# Patient Record
Sex: FEMALE | Race: ASIAN | Employment: FULL TIME | URBAN - METROPOLITAN AREA
[De-identification: names, ages, dates, MRNs, and addresses within clinical notes are randomized per-mention and may not be internally consistent; named-entity substitution may affect disease eponyms.]

---

## 2019-06-04 ENCOUNTER — OFFICE VISIT (OUTPATIENT)
Dept: PRIMARY CARE CLINIC | Age: 29
End: 2019-06-04

## 2019-06-04 VITALS
HEART RATE: 87 BPM | BODY MASS INDEX: 23.46 KG/M2 | TEMPERATURE: 98.5 F | WEIGHT: 140.8 LBS | HEIGHT: 65 IN | RESPIRATION RATE: 16 BRPM | OXYGEN SATURATION: 100 % | SYSTOLIC BLOOD PRESSURE: 95 MMHG | DIASTOLIC BLOOD PRESSURE: 61 MMHG

## 2019-06-04 DIAGNOSIS — E53.8 B12 DEFICIENCY DUE TO DIET: ICD-10-CM

## 2019-06-04 DIAGNOSIS — E55.9 VITAMIN D DEFICIENCY: ICD-10-CM

## 2019-06-04 DIAGNOSIS — Z00.00 PHYSICAL EXAM: Primary | ICD-10-CM

## 2019-06-04 NOTE — PROGRESS NOTES
Written by Pancho Broderick, as dictated by Dr. Jane Landry MD.    Gris Galvin is a 42 y.o. female. HPI  The patient comes in today to establish care. Patient is requesting a complete physical exam and labs. She is not fasting for labs today. Pt has mild seasonal allergies. Denies headaches, heartburn, constipation, urinary sxs, leg swelling. Patient has been feeling tired and requests her iron and vitamin B12 levels be checked. She previously took iron tablets, but now is only taking a multivitamin. The pt follows a vegetarian diet. Sometimes she has pain from her R calf to her R foot. This pain occurs when she is laying down and started about 3 months ago. This occurs once every 1-2 months and lasts for several days. She admits that she does not exercise regularly. The pt received multiple vaccinations during her pregnancy, but is not sure which vaccines she received. She has an 21 month old son. Her pregnancy was normal, but she has a  as the umbilical cord was around her son's neck. Last pap smear was about 6 months ago and was normal.    Patient works at [employer]. No current outpatient medications on file prior to visit. No current facility-administered medications on file prior to visit.         Past Surgical History:   Procedure Laterality Date    HX  SECTION         Social History     Socioeconomic History    Marital status:      Spouse name: Not on file    Number of children: Not on file    Years of education: Not on file    Highest education level: Not on file   Occupational History    Not on file   Social Needs    Financial resource strain: Not on file    Food insecurity:     Worry: Not on file     Inability: Not on file    Transportation needs:     Medical: Not on file     Non-medical: Not on file   Tobacco Use    Smoking status: Never Smoker    Smokeless tobacco: Never Used   Substance and Sexual Activity    Alcohol use: Not Currently     Frequency: Never    Drug use: Never    Sexual activity: Not on file   Lifestyle    Physical activity:     Days per week: Not on file     Minutes per session: Not on file    Stress: Not on file   Relationships    Social connections:     Talks on phone: Not on file     Gets together: Not on file     Attends Yazdanism service: Not on file     Active member of club or organization: Not on file     Attends meetings of clubs or organizations: Not on file     Relationship status: Not on file    Intimate partner violence:     Fear of current or ex partner: Not on file     Emotionally abused: Not on file     Physically abused: Not on file     Forced sexual activity: Not on file   Other Topics Concern    Not on file   Social History Narrative    Not on file       Review of Systems   Constitutional: Positive for malaise/fatigue. HENT: Negative for congestion. Eyes: Negative for blurred vision and pain. Respiratory: Negative for cough and shortness of breath. Cardiovascular: Negative for chest pain and palpitations. Gastrointestinal: Negative for abdominal pain, constipation and heartburn. Genitourinary: Negative for frequency and urgency. Musculoskeletal: Negative for joint pain and myalgias. +RLE pain   Neurological: Negative for dizziness, tingling, sensory change, weakness and headaches. Endo/Heme/Allergies: Positive for environmental allergies. Psychiatric/Behavioral: Negative for depression, memory loss and substance abuse. Visit Vitals  BP 95/61 (BP 1 Location: Left arm, BP Patient Position: Sitting)   Pulse 87   Temp 98.5 °F (36.9 °C) (Oral)   Resp 16   Ht 5' 5\" (1.651 m)   Wt 140 lb 12.8 oz (63.9 kg)   LMP 05/12/2019   SpO2 100%   BMI 23.43 kg/m²       Physical Exam   Constitutional: She is oriented to person, place, and time. She appears well-developed and well-nourished. No distress.    HENT:   Right Ear: Tympanic membrane, external ear and ear canal normal.   Left Ear: Tympanic membrane, external ear and ear canal normal.   Mouth/Throat: Oropharynx is clear and moist.   Eyes: Conjunctivae and EOM are normal. Right eye exhibits no discharge. Left eye exhibits no discharge. Neck: Normal range of motion. Neck supple. No thyromegaly present. Cardiovascular: Normal rate, regular rhythm and normal heart sounds. Pulses:       Dorsalis pedis pulses are 2+ on the right side, and 2+ on the left side. Pulmonary/Chest: Effort normal and breath sounds normal. She has no wheezes. Abdominal: Soft. Bowel sounds are normal. There is no tenderness. Musculoskeletal: She exhibits no edema. Lymphadenopathy:     She has no cervical adenopathy. Neurological: She is alert and oriented to person, place, and time. Reflex Scores:       Patellar reflexes are 2+ on the right side and 2+ on the left side. Skin: She is not diaphoretic. Psychiatric: She has a normal mood and affect. Her behavior is normal.   Nursing note and vitals reviewed. ASSESSMENT and PLAN    ICD-10-CM ICD-9-CM    1. Physical exam Z74.47 H43.7 METABOLIC PANEL, COMPREHENSIVE      CBC W/O DIFF      TSH 3RD GENERATION      LIPID PANEL    Complete physical exam done. Pt will return during lab hours to have basic fasting labs drawn. 2. B12 deficiency due to diet E53.8 266.2 VITAMIN B12    Vitamin B12 ordered. 3. Vitamin D deficiency E55.9 268.9 VITAMIN D, 25 HYDROXY    Vitamin D ordered. She should contact her OB/GYN to find out when she received her last Tdap and tell my office. This plan was reviewed with the patient and patient agrees. All questions were answered. This scribe documentation was reviewed by me and accurately reflects the examination and decisions made by me. This note will not be viewable in [address]

## 2019-06-04 NOTE — PROGRESS NOTES
Chief Complaint   Patient presents with    Physical     Pap in last 6 months. Visit Vitals  BP 95/61 (BP 1 Location: Left arm, BP Patient Position: Sitting)   Pulse 87   Temp 98.5 °F (36.9 °C) (Oral)   Resp 16   Ht 5' 5\" (1.651 m)   Wt 140 lb 12.8 oz (63.9 kg)   SpO2 100%   BMI 23.43 kg/m²     1. Have you been to the ER, urgent care clinic since your last visit? Hospitalized since your last visit? No    2. Have you seen or consulted any other health care providers outside of the [address] [place] [address] since your last visit? Include any pap smears or colon screening.  GYN

## 2019-06-05 DIAGNOSIS — E53.8 B12 DEFICIENCY DUE TO DIET: ICD-10-CM

## 2019-06-05 DIAGNOSIS — E55.9 VITAMIN D DEFICIENCY: ICD-10-CM

## 2019-06-05 DIAGNOSIS — Z00.00 PHYSICAL EXAM: ICD-10-CM

## 2019-06-06 LAB
25(OH)D3+25(OH)D2 SERPL-MCNC: 18.5 NG/ML (ref 30–100)
ALBUMIN SERPL-MCNC: 4.4 G/DL (ref 3.5–5.5)
ALBUMIN/GLOB SERPL: 1.7 {RATIO} (ref 1.2–2.2)
ALP SERPL-CCNC: 96 IU/L (ref 39–117)
ALT SERPL-CCNC: 29 IU/L (ref 0–32)
AST SERPL-CCNC: 22 IU/L (ref 0–40)
BILIRUB SERPL-MCNC: 0.6 MG/DL (ref 0–1.2)
BUN SERPL-MCNC: 9 MG/DL (ref 6–20)
BUN/CREAT SERPL: 14 (ref 9–23)
CALCIUM SERPL-MCNC: 9.5 MG/DL (ref 8.7–10.2)
CHLORIDE SERPL-SCNC: 103 MMOL/L (ref 96–106)
CHOLEST SERPL-MCNC: 165 MG/DL (ref 100–199)
CO2 SERPL-SCNC: 22 MMOL/L (ref 20–29)
CREAT SERPL-MCNC: 0.65 MG/DL (ref 0.57–1)
ERYTHROCYTE [DISTWIDTH] IN BLOOD BY AUTOMATED COUNT: 13.4 % (ref 12.3–15.4)
GLOBULIN SER CALC-MCNC: 2.6 G/DL (ref 1.5–4.5)
GLUCOSE SERPL-MCNC: 86 MG/DL (ref 65–99)
HCT VFR BLD AUTO: 40.2 % (ref 34–46.6)
HDLC SERPL-MCNC: 37 MG/DL
HGB BLD-MCNC: 13.1 G/DL (ref 11.1–15.9)
LDLC SERPL CALC-MCNC: 63 MG/DL (ref 0–99)
MCH RBC QN AUTO: 28.1 PG (ref 26.6–33)
MCHC RBC AUTO-ENTMCNC: 32.6 G/DL (ref 31.5–35.7)
MCV RBC AUTO: 86 FL (ref 79–97)
PLATELET # BLD AUTO: 176 X10E3/UL (ref 150–450)
POTASSIUM SERPL-SCNC: 4.1 MMOL/L (ref 3.5–5.2)
PROT SERPL-MCNC: 7 G/DL (ref 6–8.5)
RBC # BLD AUTO: 4.67 X10E6/UL (ref 3.77–5.28)
SODIUM SERPL-SCNC: 140 MMOL/L (ref 134–144)
TRIGL SERPL-MCNC: 324 MG/DL (ref 0–149)
TSH SERPL DL<=0.005 MIU/L-ACNC: 2.11 UIU/ML (ref 0.45–4.5)
VIT B12 SERPL-MCNC: 641 PG/ML (ref 232–1245)
VLDLC SERPL CALC-MCNC: 65 MG/DL (ref 5–40)
WBC # BLD AUTO: 8.3 X10E3/UL (ref 3.4–10.8)

## 2019-06-10 NOTE — PROGRESS NOTES
Virgle Round, your cholesterol came back high. Were you fasting for labs? Vitamin d came back low. Please take over the counter Vitamin D 3 1000 I.U daily dose. Also, if you were not fasting for labs please stop by at office any day between 8-11:30 to get repeat cholesterol checked. Make sure you are fasting for 8 hrs.

## 2019-12-09 ENCOUNTER — OFFICE VISIT (OUTPATIENT)
Dept: PRIMARY CARE CLINIC | Age: 29
End: 2019-12-09

## 2019-12-09 VITALS
HEIGHT: 65 IN | HEART RATE: 88 BPM | OXYGEN SATURATION: 100 % | WEIGHT: 139 LBS | BODY MASS INDEX: 23.16 KG/M2 | SYSTOLIC BLOOD PRESSURE: 100 MMHG | TEMPERATURE: 98.1 F | RESPIRATION RATE: 17 BRPM | DIASTOLIC BLOOD PRESSURE: 71 MMHG

## 2019-12-09 DIAGNOSIS — A02.9 SALMONELLA INFECTION, UNSPECIFIED: Primary | ICD-10-CM

## 2019-12-09 DIAGNOSIS — R05.3 PERSISTENT DRY COUGH: ICD-10-CM

## 2019-12-09 DIAGNOSIS — R35.0 URINARY FREQUENCY: ICD-10-CM

## 2019-12-09 DIAGNOSIS — B37.31 VAGINAL CANDIDIASIS: ICD-10-CM

## 2019-12-09 LAB
BILIRUB UR QL STRIP: NEGATIVE
GLUCOSE UR-MCNC: NEGATIVE MG/DL
KETONES P FAST UR STRIP-MCNC: NEGATIVE MG/DL
PH UR STRIP: 7.5 [PH] (ref 4.6–8)
PROT UR QL STRIP: NEGATIVE
SP GR UR STRIP: 1.02 (ref 1–1.03)
UA UROBILINOGEN AMB POC: NORMAL (ref 0.2–1)
URINALYSIS CLARITY POC: CLEAR
URINALYSIS COLOR POC: NORMAL
URINE BLOOD POC: NEGATIVE
URINE LEUKOCYTES POC: NORMAL
URINE NITRITES POC: NEGATIVE

## 2019-12-09 RX ORDER — FLUCONAZOLE 150 MG/1
150 TABLET ORAL DAILY
Qty: 1 TAB | Refills: 0 | Status: SHIPPED | OUTPATIENT
Start: 2019-12-09 | End: 2019-12-10

## 2019-12-09 RX ORDER — BENZONATATE 200 MG/1
200 CAPSULE ORAL
Qty: 21 CAP | Refills: 0 | Status: SHIPPED | OUTPATIENT
Start: 2019-12-09 | End: 2019-12-16

## 2019-12-09 NOTE — PROGRESS NOTES
Written by Janak Wheeler, as dictated by Dr. Marilu Simmonds, MD.    Emily Paz is a 30 y.o. female. HPI  The patient presents today c/o fever and vomiting. She recently returned from a 4 week trip to [facility], and while there, about 2 weeks ago, she had fever, vomiting, and diarrhea. She went to the Urgent Care Clinic, and was diagnosed with Salmonella infection. She was then prescribed Quinolone abx x 5 days. The GI symptoms resolved, but she still has a cough, which at times brings up white phlegm. She has tried cough syrup without improvement, and the coughing has kept her up at night. She believes she has a urinary tract infection, as she has been urinating frequently, though denies dysuria. She reports having white vaginal discharge, but otherwise denies vaginal pruritis. No current outpatient medications on file prior to visit. No current facility-administered medications on file prior to visit.         Past Surgical History:   Procedure Laterality Date    HX  SECTION         Social History     Socioeconomic History    Marital status:      Spouse name: Not on file    Number of children: Not on file    Years of education: Not on file    Highest education level: Not on file   Occupational History    Not on file   Social Needs    Financial resource strain: Not on file    Food insecurity:     Worry: Not on file     Inability: Not on file    Transportation needs:     Medical: Not on file     Non-medical: Not on file   Tobacco Use    Smoking status: Never Smoker    Smokeless tobacco: Never Used   Substance and Sexual Activity    Alcohol use: Not Currently     Frequency: Never    Drug use: Never    Sexual activity: Not on file   Lifestyle    Physical activity:     Days per week: Not on file     Minutes per session: Not on file    Stress: Not on file   Relationships    Social connections:     Talks on phone: Not on file     Gets together: Not on file     Attends [employer] service: Not on file     Active member of club or organization: Not on file     Attends meetings of clubs or organizations: Not on file     Relationship status: Not on file    Intimate partner violence:     Fear of current or ex partner: Not on file     Emotionally abused: Not on file     Physically abused: Not on file     Forced sexual activity: Not on file   Other Topics Concern    Not on file   Social History Narrative    Not on file       Review of Systems   Constitutional: Positive for fever. Negative for malaise/fatigue and weight loss. HENT: Negative for congestion and hearing loss. Respiratory: Positive for cough. Negative for shortness of breath. Gastrointestinal: Positive for abdominal pain (resolved), diarrhea (resolved), nausea (resolved) and vomiting (resolved). Negative for constipation and heartburn. Genitourinary: Positive for frequency. Negative for dysuria and urgency. + white vaginal discharge   Musculoskeletal: Negative for joint pain and myalgias. Neurological: Negative for dizziness and headaches. Psychiatric/Behavioral: Negative for depression and substance abuse. The patient is not nervous/anxious and does not have insomnia. Visit Vitals  /71 (BP 1 Location: Left arm, BP Patient Position: Sitting)   Pulse 88   Temp 98.1 °F (36.7 °C) (Oral)   Resp 17   Ht 5' 5\" (1.651 m)   Wt 139 lb (63 kg)   LMP 11/22/2019   SpO2 100%   BMI 23.13 kg/m²       Physical Exam  Vitals signs and nursing note reviewed. Constitutional:       General: She is not in acute distress. Appearance: She is well-developed. She is not diaphoretic. HENT:      Head: Normocephalic and atraumatic. Right Ear: External ear normal.      Left Ear: External ear normal.   Eyes:      General:         Right eye: No discharge. Left eye: No discharge.       Conjunctiva/sclera: Conjunctivae normal.      Pupils: Pupils are equal, round, and reactive to light. Neck:      Musculoskeletal: Normal range of motion and neck supple. Cardiovascular:      Rate and Rhythm: Normal rate and regular rhythm. Heart sounds: Normal heart sounds. No murmur. No friction rub. No gallop. Pulmonary:      Effort: Pulmonary effort is normal.      Breath sounds: Normal breath sounds. No wheezing. Abdominal:      General: Bowel sounds are normal.      Palpations: Abdomen is soft. Tenderness: There is no tenderness. Musculoskeletal: Normal range of motion. Neurological:      Mental Status: She is alert and oriented to person, place, and time. Deep Tendon Reflexes: Reflexes are normal and symmetric. Psychiatric:         Behavior: Behavior normal.         Thought Content: Thought content normal.         ASSESSMENT and PLAN    ICD-10-CM ICD-9-CM    1. Salmonella infection, unspecified A02.9 003.9 Discussed Salmonella infection does not cause URI issues, and most likely resolved with the resolution of her stomach issues. 2. Urinary frequency R35.0 788.41 AMB POC URINALYSIS DIP STICK AUTO W/ MICRO     POC UA showed Trace Leukocyte esterase. will send the culture out. 3. Persistent dry cough R05 786.2 benzonatate (TESSALON) 200 mg capsule sent to pharmacy. Tessalon perle 200 mg prescribed. Potential side effects were discussed. Pt should take 1 perle TID as needed for cough. Advised pt to keep hydrated. 4. Vaginal candidiasis B37.3 112.1 fluconazole (DIFLUCAN) 150 mg tablet sent to pharmacy. Diflucan 150 mg prescribed. Potential side effects were discussed. Pt should take 1 tab x 1 day. This plan was reviewed with the patient and patient agrees. All questions were answered. This scribe documentation was reviewed by me and accurately reflects the examination and decisions made by me. This note will not be viewable in [address]

## 2019-12-24 ENCOUNTER — OFFICE VISIT (OUTPATIENT)
Dept: PRIMARY CARE CLINIC | Age: 29
End: 2019-12-24

## 2019-12-24 VITALS
SYSTOLIC BLOOD PRESSURE: 122 MMHG | BODY MASS INDEX: 23.09 KG/M2 | TEMPERATURE: 98.8 F | HEART RATE: 84 BPM | RESPIRATION RATE: 14 BRPM | WEIGHT: 138.6 LBS | OXYGEN SATURATION: 100 % | DIASTOLIC BLOOD PRESSURE: 78 MMHG | HEIGHT: 65 IN

## 2019-12-24 DIAGNOSIS — M79.605 PAIN IN BOTH LOWER EXTREMITIES: ICD-10-CM

## 2019-12-24 DIAGNOSIS — R42 DIZZINESS: Primary | ICD-10-CM

## 2019-12-24 DIAGNOSIS — R53.83 OTHER FATIGUE: ICD-10-CM

## 2019-12-24 DIAGNOSIS — R09.81 NASAL CONGESTION: ICD-10-CM

## 2019-12-24 DIAGNOSIS — M79.604 PAIN IN BOTH LOWER EXTREMITIES: ICD-10-CM

## 2019-12-24 RX ORDER — FLUTICASONE PROPIONATE 50 MCG
SPRAY, SUSPENSION (ML) NASAL
Qty: 1 BOTTLE | Refills: 0 | Status: SHIPPED | OUTPATIENT
Start: 2019-12-24

## 2019-12-24 RX ORDER — GLUCOSAMINE SULFATE 1500 MG
POWDER IN PACKET (EA) ORAL DAILY
COMMUNITY

## 2019-12-24 NOTE — PROGRESS NOTES
Visit Vitals  /78 (BP 1 Location: Left arm, BP Patient Position: Sitting)   Pulse 84   Temp 98.8 °F (37.1 °C) (Oral)   Resp 14   Ht 5' 5\" (1.651 m)   Wt 138 lb 9.6 oz (62.9 kg)   SpO2 100%   BMI 23.06 kg/m²           Chief Complaint   Patient presents with    Dizziness     x 3 days     Fatigue     Increased x 3 days     Leg Pain     Worse at night, restlessness, wakes her up at night            HM Due Reviewed and WNL            1. Have you been to the ER, urgent care clinic since your last visit? Hospitalized since your last visit? Denies     2. Have you seen or consulted any other health care providers outside of the [address] [place] [address] since your last visit? Include any pap smears or colon screening.  Denies

## 2019-12-24 NOTE — PROGRESS NOTES
Written by Mary Canales, as dictated by Dr. Anmol Faulkner MD.    Ambrose Devi is a 30 y.o. female. HPI Pt is accompanied by her . The patient presents today for dizziness and fatigue. She is not fasting for labs. She reports 2-3 days of intermittent dizziness, especially after sitting for a long time then standing. Her  reports that she has not been drinking much water. She notes that she is on her period, but these symptoms started before her menstrual cycle began. BP looks good at office today at 122/78. She notes associated fatigue, weakness, and nausea. She tends to have heavy periods, but thinks that it is heavier now than usual. She states that her symptoms are improved with drinking salt and sugar water. She also endorses bilateral leg pain (R>L) that feels like a muscle strain, especially when laying down at night. She states that massaging her legs mitigates the pain. She states that she was taking her Vitamin D supplements for 2 months, stopped, then restarted 3 days ago. She notes bilateral leg pain for the past 6-7 months, but it is worse with the dizziness and fatigue. Denies bladder and bowel issues. Of note, she reports that her cough has resolved. Current Outpatient Medications on File Prior to Visit   Medication Sig Dispense Refill    cholecalciferol (VITAMIN D3) 25 mcg (1,000 unit) cap Take  by mouth daily. No current facility-administered medications on file prior to visit.         Past Surgical History:   Procedure Laterality Date    HX  SECTION       Social History     Socioeconomic History    Marital status:      Spouse name: Not on file    Number of children: Not on file    Years of education: Not on file    Highest education level: Not on file   Occupational History    Not on file   Social Needs    Financial resource strain: Not on file    Food insecurity:     Worry: Not on file Inability: Not on file    Transportation needs:     Medical: Not on file     Non-medical: Not on file   Tobacco Use    Smoking status: Never Smoker    Smokeless tobacco: Never Used   Substance and Sexual Activity    Alcohol use: Not Currently     Frequency: Never    Drug use: Never    Sexual activity: Not on file   Lifestyle    Physical activity:     Days per week: Not on file     Minutes per session: Not on file    Stress: Not on file   Relationships    Social connections:     Talks on phone: Not on file     Gets together: Not on file     Attends [employer] service: Not on file     Active member of club or organization: Not on file     Attends meetings of clubs or organizations: Not on file     Relationship status: Not on file    Intimate partner violence:     Fear of current or ex partner: Not on file     Emotionally abused: Not on file     Physically abused: Not on file     Forced sexual activity: Not on file   Other Topics Concern    Not on file   Social History Narrative    Not on file       Office Visit on 12/09/2019   Component Date Value Ref Range Status    Color (UA POC) 12/09/2019 Dark Yellow   Final    Clarity (UA POC) 12/09/2019 Clear   Final    Glucose (UA POC) 12/09/2019 Negative  Negative Final    Bilirubin (UA POC) 12/09/2019 Negative  Negative Final    Ketones (UA POC) 12/09/2019 Negative  Negative Final    Specific gravity (UA POC) 12/09/2019 1.020  1.001 - 1.035 Final    Blood (UA POC) 12/09/2019 Negative  Negative Final    pH (UA POC) 12/09/2019 7.5  4.6 - 8.0 Final    Protein (UA POC) 12/09/2019 Negative  Negative Final    Urobilinogen (UA POC) 12/09/2019 normal  0.2 - 1 Final    Nitrites (UA POC) 12/09/2019 Negative  Negative Final    Leukocyte esterase (UA POC) 12/09/2019 Trace  Negative Final       Review of Systems   Constitutional: Positive for malaise/fatigue. Negative for weight loss. HENT: Negative for congestion and hearing loss.     Eyes: Negative for blurred vision and photophobia. Respiratory: Negative for cough and shortness of breath. Cardiovascular: Negative for chest pain and leg swelling. Gastrointestinal: Positive for nausea. Negative for constipation, diarrhea and heartburn. Genitourinary: Negative for dysuria, frequency and urgency. Musculoskeletal: Negative for joint pain and myalgias. Positive for BL leg pain. Neurological: Positive for dizziness and weakness. Negative for headaches. Psychiatric/Behavioral: Negative for depression and substance abuse. The patient is not nervous/anxious and does not have insomnia. Visit Vitals  /78 (BP 1 Location: Left arm, BP Patient Position: Sitting)   Pulse 84   Temp 98.8 °F (37.1 °C) (Oral)   Resp 14   Ht 5' 5\" (1.651 m)   Wt 138 lb 9.6 oz (62.9 kg)   LMP 12/21/2019   SpO2 100%   BMI 23.06 kg/m²       Physical Exam  Vitals signs and nursing note reviewed. Constitutional:       General: She is not in acute distress. Appearance: Normal appearance. She is well-developed and normal weight. She is not diaphoretic. HENT:      Head: Normocephalic and atraumatic. Right Ear: External ear normal.      Left Ear: External ear normal.      Nose:      Right Sinus: Maxillary sinus tenderness present. Left Sinus: Maxillary sinus tenderness present. Eyes:      General:         Right eye: No discharge. Left eye: No discharge. Conjunctiva/sclera: Conjunctivae normal.      Pupils: Pupils are equal, round, and reactive to light. Neck:      Musculoskeletal: Normal range of motion and neck supple. Cardiovascular:      Rate and Rhythm: Normal rate and regular rhythm. Heart sounds: Normal heart sounds. No murmur. No friction rub. No gallop. Pulmonary:      Effort: Pulmonary effort is normal.      Breath sounds: Normal breath sounds. No wheezing. Abdominal:      General: Bowel sounds are normal.      Palpations: Abdomen is soft. Tenderness: There is no tenderness. Musculoskeletal: Normal range of motion. Neurological:      Mental Status: She is alert and oriented to person, place, and time. Deep Tendon Reflexes: Reflexes are normal and symmetric. Psychiatric:         Behavior: Behavior normal.         Thought Content: Thought content normal.         ASSESSMENT and PLAN    ICD-10-CM ICD-9-CM    1. Dizziness R42 780.4 CBC W/O DIFF      METABOLIC PANEL, COMPREHENSIVE    CBC and CMP ordered. Recommended that the patient take Allegra or Zyrtec daily for the next week. Also advised her to drink 5-6 glasses of water daily. Written instructions given to the patient. CBC ordered. 2. Other fatigue R53.83 780.79 CBC W/O DIFF    Low hemoglobin can cause fatigue. 3. Pain in both lower extremities M79.604 729.5 Advised the pt to resume taking her Vitamin D supplements. Recommended that she consume beans, apples, and spinach to increase iron levels. M79.605     4. Nasal congestion R09.81 478.19 fluticasone propionate (FLONASE) 50 mcg/actuation nasal spray sent to pharmacy    Flonase 50 mcg prescribed. Potential side effects were discussed. Pt should use one spray per nostril daily. This plan was reviewed with the patient and patient agrees. All questions were answered. This scribe documentation was reviewed by me and accurately reflects the examination and decisions made by me. This note will not be viewable in [address]

## 2019-12-25 LAB
ALBUMIN SERPL-MCNC: 4.3 G/DL (ref 3.5–5.5)
ALBUMIN/GLOB SERPL: 1.5 {RATIO} (ref 1.2–2.2)
ALP SERPL-CCNC: 80 IU/L (ref 39–117)
ALT SERPL-CCNC: 18 IU/L (ref 0–32)
AST SERPL-CCNC: 22 IU/L (ref 0–40)
BILIRUB SERPL-MCNC: 0.3 MG/DL (ref 0–1.2)
BUN SERPL-MCNC: 10 MG/DL (ref 6–20)
BUN/CREAT SERPL: 16 (ref 9–23)
CALCIUM SERPL-MCNC: 9.3 MG/DL (ref 8.7–10.2)
CHLORIDE SERPL-SCNC: 103 MMOL/L (ref 96–106)
CO2 SERPL-SCNC: 25 MMOL/L (ref 20–29)
CREAT SERPL-MCNC: 0.61 MG/DL (ref 0.57–1)
ERYTHROCYTE [DISTWIDTH] IN BLOOD BY AUTOMATED COUNT: 12.9 % (ref 12.3–15.4)
GLOBULIN SER CALC-MCNC: 2.9 G/DL (ref 1.5–4.5)
GLUCOSE SERPL-MCNC: 82 MG/DL (ref 65–99)
HCT VFR BLD AUTO: 37 % (ref 34–46.6)
HGB BLD-MCNC: 12.9 G/DL (ref 11.1–15.9)
MCH RBC QN AUTO: 28.4 PG (ref 26.6–33)
MCHC RBC AUTO-ENTMCNC: 34.9 G/DL (ref 31.5–35.7)
MCV RBC AUTO: 81 FL (ref 79–97)
PLATELET # BLD AUTO: 209 X10E3/UL (ref 150–450)
POTASSIUM SERPL-SCNC: 4.4 MMOL/L (ref 3.5–5.2)
PROT SERPL-MCNC: 7.2 G/DL (ref 6–8.5)
RBC # BLD AUTO: 4.55 X10E6/UL (ref 3.77–5.28)
SODIUM SERPL-SCNC: 139 MMOL/L (ref 134–144)
WBC # BLD AUTO: 6.8 X10E3/UL (ref 3.4–10.8)

## 2020-08-25 ENCOUNTER — OFFICE VISIT (OUTPATIENT)
Dept: PRIMARY CARE CLINIC | Age: 30
End: 2020-08-25
Payer: COMMERCIAL

## 2020-08-25 VITALS
TEMPERATURE: 97.3 F | OXYGEN SATURATION: 100 % | BODY MASS INDEX: 23.36 KG/M2 | SYSTOLIC BLOOD PRESSURE: 110 MMHG | WEIGHT: 140.2 LBS | HEIGHT: 65 IN | DIASTOLIC BLOOD PRESSURE: 74 MMHG | HEART RATE: 81 BPM | RESPIRATION RATE: 16 BRPM

## 2020-08-25 DIAGNOSIS — Z12.4 CERVICAL CANCER SCREENING: ICD-10-CM

## 2020-08-25 DIAGNOSIS — E55.9 VITAMIN D DEFICIENCY: ICD-10-CM

## 2020-08-25 DIAGNOSIS — Z00.00 PHYSICAL EXAM: Primary | ICD-10-CM

## 2020-08-25 DIAGNOSIS — E78.2 MIXED HYPERLIPIDEMIA: ICD-10-CM

## 2020-08-25 LAB
ALBUMIN SERPL-MCNC: 4 G/DL (ref 3.5–5)
ALBUMIN/GLOB SERPL: 1.1 {RATIO} (ref 1.1–2.2)
ALP SERPL-CCNC: 91 U/L (ref 45–117)
ALT SERPL-CCNC: 28 U/L (ref 12–78)
ANION GAP SERPL CALC-SCNC: 3 MMOL/L (ref 5–15)
AST SERPL-CCNC: 22 U/L (ref 15–37)
BASOPHILS # BLD: 0 K/UL (ref 0–0.1)
BASOPHILS NFR BLD: 0 % (ref 0–1)
BILIRUB SERPL-MCNC: 0.5 MG/DL (ref 0.2–1)
BUN SERPL-MCNC: 10 MG/DL (ref 6–20)
BUN/CREAT SERPL: 14 (ref 12–20)
CALCIUM SERPL-MCNC: 9.6 MG/DL (ref 8.5–10.1)
CHLORIDE SERPL-SCNC: 106 MMOL/L (ref 97–108)
CHOLEST SERPL-MCNC: 178 MG/DL
CO2 SERPL-SCNC: 29 MMOL/L (ref 21–32)
CREAT SERPL-MCNC: 0.73 MG/DL (ref 0.55–1.02)
DIFFERENTIAL METHOD BLD: NORMAL
EOSINOPHIL # BLD: 0.1 K/UL (ref 0–0.4)
EOSINOPHIL NFR BLD: 1 % (ref 0–7)
ERYTHROCYTE [DISTWIDTH] IN BLOOD BY AUTOMATED COUNT: 13 % (ref 11.5–14.5)
GLOBULIN SER CALC-MCNC: 3.5 G/DL (ref 2–4)
GLUCOSE SERPL-MCNC: 87 MG/DL (ref 65–100)
HCT VFR BLD AUTO: 40.1 % (ref 35–47)
HDLC SERPL-MCNC: 46 MG/DL
HDLC SERPL: 3.9 {RATIO} (ref 0–5)
HGB BLD-MCNC: 13.2 G/DL (ref 11.5–16)
IMM GRANULOCYTES # BLD AUTO: 0 K/UL (ref 0–0.04)
IMM GRANULOCYTES NFR BLD AUTO: 0 % (ref 0–0.5)
LDLC SERPL CALC-MCNC: 94.8 MG/DL (ref 0–100)
LIPID PROFILE,FLP: ABNORMAL
LYMPHOCYTES # BLD: 1.9 K/UL (ref 0.8–3.5)
LYMPHOCYTES NFR BLD: 39 % (ref 12–49)
MCH RBC QN AUTO: 27.6 PG (ref 26–34)
MCHC RBC AUTO-ENTMCNC: 32.9 G/DL (ref 30–36.5)
MCV RBC AUTO: 83.9 FL (ref 80–99)
MONOCYTES # BLD: 0.2 K/UL (ref 0–1)
MONOCYTES NFR BLD: 5 % (ref 5–13)
NEUTS SEG # BLD: 2.6 K/UL (ref 1.8–8)
NEUTS SEG NFR BLD: 54 % (ref 32–75)
NRBC # BLD: 0 K/UL (ref 0–0.01)
NRBC BLD-RTO: 0 PER 100 WBC
PLATELET # BLD AUTO: 189 K/UL (ref 150–400)
PMV BLD AUTO: 12.1 FL (ref 8.9–12.9)
POTASSIUM SERPL-SCNC: 4.1 MMOL/L (ref 3.5–5.1)
PROT SERPL-MCNC: 7.5 G/DL (ref 6.4–8.2)
RBC # BLD AUTO: 4.78 M/UL (ref 3.8–5.2)
SODIUM SERPL-SCNC: 138 MMOL/L (ref 136–145)
TRIGL SERPL-MCNC: 186 MG/DL (ref ?–150)
TSH SERPL DL<=0.05 MIU/L-ACNC: 1.54 UIU/ML (ref 0.36–3.74)
VLDLC SERPL CALC-MCNC: 37.2 MG/DL
WBC # BLD AUTO: 4.9 K/UL (ref 3.6–11)

## 2020-08-25 PROCEDURE — 99395 PREV VISIT EST AGE 18-39: CPT | Performed by: INTERNAL MEDICINE

## 2020-08-25 NOTE — PROGRESS NOTES
Written by Tierney Singh, as dictated by Dr. Sravanthi Alan MD.    Dontrell Laguerre is a 26 y.o. female. HPI  The patient comes in fasting today for a complete physical examination and labs. Her last labs drawn on 19 showed elevated triglyceride (324) and VLDL (65) as well as depressed HDL (37). She has been having a more active lifestyle since getting those last results. She has a 3year old son, and she was in [place] for that pregnancy. She would like a recommendation for an OBGYN in this area, as she is planning on having another baby and is also due for her Pap smear. She sleeps well, although her son has night marlow and does wake her up sometimes. She notes some urinary frequency, however she also drinks a lot of water attributes it to this. She works for [employer] and will be working from home until the end of . She got the Tdap vaccine in 2017 in [place]. Current Outpatient Medications on File Prior to Visit   Medication Sig Dispense Refill    cholecalciferol (VITAMIN D3) 25 mcg (1,000 unit) cap Take  by mouth daily.  fluticasone propionate (FLONASE) 50 mcg/actuation nasal spray 1 spray each nostril daily 1 Bottle 0     No current facility-administered medications on file prior to visit. No Known Allergies    History reviewed. No pertinent past medical history. Past Surgical History:   Procedure Laterality Date    HX  SECTION         History reviewed. No pertinent family history.     Social History     Socioeconomic History    Marital status:      Spouse name: Not on file    Number of children: Not on file    Years of education: Not on file    Highest education level: Not on file   Occupational History    Not on file   Social Needs    Financial resource strain: Not on file    Food insecurity     Worry: Not on file     Inability: Not on file    Transportation needs     Medical: Not on file     Non-medical: Not on file   Tobacco Use    Smoking status: Never Smoker    Smokeless tobacco: Never Used   Substance and Sexual Activity    Alcohol use: Not Currently     Frequency: Never    Drug use: Never    Sexual activity: Not on file   Lifestyle    Physical activity     Days per week: Not on file     Minutes per session: Not on file    Stress: Not on file   Relationships    Social connections     Talks on phone: Not on file     Gets together: Not on file     Attends Latter-day service: Not on file     Active member of club or organization: Not on file     Attends meetings of clubs or organizations: Not on file     Relationship status: Not on file    Intimate partner violence     Fear of current or ex partner: Not on file     Emotionally abused: Not on file     Physically abused: Not on file     Forced sexual activity: Not on file   Other Topics Concern    Not on file   Social History Narrative    Not on file       No visits with results within 3 Month(s) from this visit.    Latest known visit with results is:   Office Visit on 12/24/2019   Component Date Value Ref Range Status    WBC 12/24/2019 6.8  3.4 - 10.8 x10E3/uL Final    RBC 12/24/2019 4.55  3.77 - 5.28 x10E6/uL Final    HGB 12/24/2019 12.9  11.1 - 15.9 g/dL Final    HCT 12/24/2019 37.0  34.0 - 46.6 % Final    MCV 12/24/2019 81  79 - 97 fL Final    MCH 12/24/2019 28.4  26.6 - 33.0 pg Final    MCHC 12/24/2019 34.9  31.5 - 35.7 g/dL Final    RDW 12/24/2019 12.9  12.3 - 15.4 % Final    Comment: **Effective January 6, 2020, the RDW pediatric reference**    interval will be removed and the adult reference interval    will be changing to:                             Female 11.7 - 15.4                                                       Male 11.6 - 15.4      PLATELET 59/96/7912 457  150 - 450 x10E3/uL Final    Glucose 12/24/2019 82  65 - 99 mg/dL Final    BUN 12/24/2019 10  6 - 20 mg/dL Final    Creatinine 12/24/2019 0.61  0.57 - 1.00 mg/dL Final    GFR est non-AA 12/24/2019 121  >59 mL/min/1.73 Final    GFR est AA 12/24/2019 140  >59 mL/min/1.73 Final    BUN/Creatinine ratio 12/24/2019 16  9 - 23 Final    Sodium 12/24/2019 139  134 - 144 mmol/L Final    Potassium 12/24/2019 4.4  3.5 - 5.2 mmol/L Final    Chloride 12/24/2019 103  96 - 106 mmol/L Final    CO2 12/24/2019 25  20 - 29 mmol/L Final    Calcium 12/24/2019 9.3  8.7 - 10.2 mg/dL Final    Protein, total 12/24/2019 7.2  6.0 - 8.5 g/dL Final    Albumin 12/24/2019 4.3  3.5 - 5.5 g/dL Final    GLOBULIN, TOTAL 12/24/2019 2.9  1.5 - 4.5 g/dL Final    A-G Ratio 12/24/2019 1.5  1.2 - 2.2 Final    Bilirubin, total 12/24/2019 0.3  0.0 - 1.2 mg/dL Final    Alk. phosphatase 12/24/2019 80  39 - 117 IU/L Final    AST (SGOT) 12/24/2019 22  0 - 40 IU/L Final    ALT (SGPT) 12/24/2019 18  0 - 32 IU/L Final     Review of Systems   Constitutional: Negative for malaise/fatigue and weight loss. HENT: Negative for congestion and sore throat. Eyes: Negative for blurred vision. Respiratory: Negative for cough and shortness of breath. Cardiovascular: Negative for chest pain and leg swelling. Gastrointestinal: Negative for constipation and heartburn. Genitourinary: Negative for frequency and urgency. Musculoskeletal: Negative for back pain, joint pain and myalgias. Neurological: Negative for dizziness and headaches. Endo/Heme/Allergies: Negative for environmental allergies. Psychiatric/Behavioral: Negative for depression. The patient is not nervous/anxious and does not have insomnia. Visit Vitals  /74 (BP 1 Location: Left arm, BP Patient Position: Sitting)   Pulse 81   Temp 97.3 °F (36.3 °C) (Temporal)   Resp 16   Ht 5' 5\" (1.651 m)   Wt 140 lb 3.2 oz (63.6 kg)   LMP 08/19/2020   SpO2 100%   BMI 23.33 kg/m²     Physical Exam  Vitals signs and nursing note reviewed. Constitutional:       General: She is not in acute distress. Appearance: Normal appearance. She is well-developed, well-groomed and normal weight. She is not diaphoretic. HENT:      Right Ear: Tympanic membrane, ear canal and external ear normal.      Left Ear: Tympanic membrane, ear canal and external ear normal.      Mouth/Throat:      Mouth: Mucous membranes are moist.      Pharynx: Oropharynx is clear. Eyes:      General: No scleral icterus. Right eye: No discharge. Left eye: No discharge. Extraocular Movements: Extraocular movements intact. Neck:      Musculoskeletal: Normal range of motion and neck supple. Thyroid: No thyromegaly. Cardiovascular:      Rate and Rhythm: Normal rate and regular rhythm. Pulses: Normal pulses. Dorsalis pedis pulses are 2+ on the right side and 2+ on the left side. Pulmonary:      Effort: Pulmonary effort is normal.      Breath sounds: Normal breath sounds. No wheezing. Abdominal:      General: Bowel sounds are normal. There is no distension. Tenderness: There is no abdominal tenderness. Musculoskeletal:      Right lower leg: No edema. Left lower leg: No edema. Comments: B/L knees without crepitus   Lymphadenopathy:      Cervical: No cervical adenopathy. Neurological:      Mental Status: She is alert and oriented to person, place, and time. Deep Tendon Reflexes:      Reflex Scores:       Patellar reflexes are 1+ on the right side and 1+ on the left side. Psychiatric:         Mood and Affect: Mood and affect normal.         Behavior: Behavior normal.       ASSESSMENT and PLAN    ICD-10-CM ICD-9-CM    1. Physical exam  Z00.00 V70.9 TSH 3RD GENERATION      CBC WITH AUTOMATED DIFF      METABOLIC PANEL, COMPREHENSIVE    Complete physical exam done. Basic labs drawn. 2. Vitamin D deficiency  E55.9 268.9 VITAMIN D, 25 HYDROXY    Labs drawn in office today. 3. Mixed hyperlipidemia  E78.2 272.2 LIPID PANEL    Ordered fasting labs for pt to complete today in office. Waiting on results.       4. Cervical cancer screening  Z12.4 V76.2 REFERRAL TO OBSTETRICS AND GYNECOLOGY    I provided a referral to Dr. Morgan Vicente or her to establish care and discuss her next pregnancy. This plan was reviewed with the patient and patient agrees. All questions were answered. This scribe documentation was reviewed by me and accurately reflects the examination and decisions made by me. This note will not be viewable in [address]

## 2020-08-25 NOTE — PROGRESS NOTES
Chief Complaint   Patient presents with    Complete Physical     patient is fasting and here for normal physical and states that she has obgyn for pap

## 2020-08-30 NOTE — PROGRESS NOTES
Beryle Rend, hope you are doing well. Your blood report came back fine. I am happy to see your Triglyceride numbers has improved significantly. Keep up the good work! Rest of your blood report looks good as well. Stay safe!

## 2020-09-23 ENCOUNTER — PATIENT MESSAGE (OUTPATIENT)
Dept: PRIMARY CARE CLINIC | Age: 30
End: 2020-09-23

## 2020-09-23 DIAGNOSIS — E53.8 B12 DEFICIENCY DUE TO DIET: Primary | ICD-10-CM

## 2020-09-28 DIAGNOSIS — E53.8 B12 DEFICIENCY DUE TO DIET: Primary | ICD-10-CM

## 2020-10-07 DIAGNOSIS — E53.8 B12 DEFICIENCY DUE TO DIET: ICD-10-CM

## 2020-10-07 LAB
25(OH)D3 SERPL-MCNC: 25.8 NG/ML (ref 30–100)
VIT B12 SERPL-MCNC: 597 PG/ML (ref 193–986)

## 2020-10-11 NOTE — PROGRESS NOTES
Starlet Folds, hope you  are enjoying your weekend. Your blood report showed normal B12 levels and low Vitamin D levels. I would recommend taking Vitamin D3 1000 I.U daily dose.

## 2021-01-13 ENCOUNTER — VIRTUAL VISIT (OUTPATIENT)
Dept: PRIMARY CARE CLINIC | Age: 31
End: 2021-01-13
Payer: COMMERCIAL

## 2021-01-13 DIAGNOSIS — R42 DIZZINESS: ICD-10-CM

## 2021-01-13 DIAGNOSIS — R11.0 NAUSEA: ICD-10-CM

## 2021-01-13 DIAGNOSIS — R53.83 OTHER FATIGUE: ICD-10-CM

## 2021-01-13 DIAGNOSIS — R44.0 AUDITORY HALLUCINATION: Primary | ICD-10-CM

## 2021-01-13 PROCEDURE — 99214 OFFICE O/P EST MOD 30 MIN: CPT | Performed by: INTERNAL MEDICINE

## 2021-01-13 RX ORDER — QUETIAPINE FUMARATE 25 MG/1
25 TABLET, FILM COATED ORAL
Qty: 30 TAB | Refills: 0 | Status: SHIPPED | OUTPATIENT
Start: 2021-01-13 | End: 2021-02-07

## 2021-01-13 NOTE — PROGRESS NOTES
Cindy Cordon (: 1990) is a 26 y.o. female, established patient, here for evaluation of the following chief complaint(s):   No chief complaint on file. Written by Betty Cazares, as dictated by Dr. Agustin Mg MD.    SUBJECTIVE/OBJECTIVE:  HPI  Pt presents virtually today to discuss auditory hallucinations. For the past few weeks she has been having near syncopal dizzy episodes once or twice a day. When she has the episodes she feels like she is going to fall, and she hears a voice talking that is not actually there. The voices are not very clear but are dream-like in quality. Initially this was not very frequent, 2x per week, but now it has increased to a few times a day. She has no h/o anxiety and tends to get these episodes during the day, not as much at night. She is working from home right now, and sometimes she will have an episodes of hearing this voice while she is talking to someone from work in a meeting or when she is cooking. She notes that she has not been sleeping well at night as she has a small son who is 3years old. Current Outpatient Medications on File Prior to Visit   Medication Sig Dispense Refill    cholecalciferol (VITAMIN D3) 25 mcg (1,000 unit) cap Take  by mouth daily.  fluticasone propionate (FLONASE) 50 mcg/actuation nasal spray 1 spray each nostril daily 1 Bottle 0     No current facility-administered medications on file prior to visit. No Known Allergies    No past medical history on file. Past Surgical History:   Procedure Laterality Date    HX  SECTION         No family history on file.     Social History     Socioeconomic History    Marital status:      Spouse name: Not on file    Number of children: Not on file    Years of education: Not on file    Highest education level: Not on file   Occupational History    Not on file   Social Needs    Financial resource strain: Not on file    Food insecurity Worry: Not on file     Inability: Not on file    Transportation needs     Medical: Not on file     Non-medical: Not on file   Tobacco Use    Smoking status: Never Smoker    Smokeless tobacco: Never Used   Substance and Sexual Activity    Alcohol use: Not Currently     Frequency: Never    Drug use: Never    Sexual activity: Not on file   Lifestyle    Physical activity     Days per week: Not on file     Minutes per session: Not on file    Stress: Not on file   Relationships    Social connections     Talks on phone: Not on file     Gets together: Not on file     Attends Religion service: Not on file     Active member of club or organization: Not on file     Attends meetings of clubs or organizations: Not on file     Relationship status: Not on file    Intimate partner violence     Fear of current or ex partner: Not on file     Emotionally abused: Not on file     Physically abused: Not on file     Forced sexual activity: Not on file   Other Topics Concern    Not on file   Social History Narrative    Not on file       No visits with results within 3 Month(s) from this visit. Latest known visit with results is:   Orders Only on 10/07/2020   Component Date Value Ref Range Status    Vitamin B12 10/07/2020 597  193 - 986 pg/mL Final    Vitamin D 25-Hydroxy 10/07/2020 25.8* 30 - 100 ng/mL Final    Comment: (NOTE)  Deficiency               <20 ng/mL  Insufficiency          20-30 ng/mL  Sufficient             ng/mL  Possible toxicity       >100 ng/mL    The Method used is Siemens Advia Centaur currently standardized to a   Center of Disease Control and Prevention (CDC) certified reference   [address]. Samples containing fluorescein dye can produce falsely   elevated values when tested with the ADVIA Centaur Vitamin D Assay. It is recommended that results in the toxic range, >100 ng/mL, be   retested 72 hours post fluorescein exposure.          Review of Systems   Constitutional: Negative for activity change, fatigue and unexpected weight change. HENT: Negative for congestion, hearing loss, rhinorrhea and sore throat. Eyes: Negative for discharge. Respiratory: Negative for cough, chest tightness and shortness of breath. Cardiovascular: Negative for leg swelling. Gastrointestinal: Negative for abdominal pain, constipation and diarrhea. Genitourinary: Negative for dysuria, flank pain, frequency and urgency. Musculoskeletal: Negative for arthralgias, back pain and myalgias. Skin: Negative for color change and rash. Neurological: Positive for dizziness and weakness. Negative for light-headedness and headaches. Psychiatric/Behavioral: Positive for hallucinations (auditory). Negative for dysphoric mood and sleep disturbance. The patient is not nervous/anxious. No flowsheet data found.     Physical Exam    [INSTRUCTIONS:  \"[x]\" Indicates a positive item  \"[]\" Indicates a negative item  -- DELETE ALL ITEMS NOT EXAMINED]    Constitutional: [x] Appears well-developed and well-nourished [x] No apparent distress      [] Abnormal -     Mental status: [x] Alert and awake  [x] Oriented to person/place/time [x] Able to follow commands    [] Abnormal -     Eyes:   EOM    [x]  Normal    [] Abnormal -   Sclera  [x]  Normal    [] Abnormal -          Discharge [x]  None visible   [] Abnormal -     HENT: [x] Normocephalic, atraumatic  [] Abnormal -   [x] Mouth/Throat: Mucous membranes are moist    External Ears [x] Normal  [] Abnormal -    Neck: [x] No visualized mass [] Abnormal -     Pulmonary/Chest: [x] Respiratory effort normal   [x] No visualized signs of difficulty breathing or respiratory distress        [] Abnormal -      Musculoskeletal:   [x] Normal gait with no signs of ataxia         [x] Normal range of motion of neck        [] Abnormal -     Neurological:        [x] No Facial Asymmetry (Cranial nerve 7 motor function) (limited exam due to video visit)          [x] No gaze palsy        [] Abnormal -          Skin:        [x] No significant exanthematous lesions or discoloration noted on facial skin         [] Abnormal -            Psychiatric:       [x] Normal Affect [] Abnormal -        [x] No Hallucinations    Other pertinent observable physical exam findings:-      ASSESSMENT/PLAN:  1. Auditory hallucination  -     QUEtiapine (SEROquel) 25 mg tablet; Take 1 Tab by mouth nightly for 30 days. , Normal, Disp-30 Tab, R-0 sent to pharmacy. Potential side effects were discussed. This is likely anxiety with some schizophrenic sx which may be from stress and sleep deprivation. I instructed her to start off taking a half tablet of Seroquel qPM as it can cause drowsiness. If she wakes up feeling refreshed and does not have any voices, she should continue on only the half tablet qPM. If she is still hearing voices in the morning or the next day, she should take a half tablet of Seroquel in the morning as well. If she is having some relief from Seroquel but not total resolution of sx she may need an increased dose. She will need to have imaging and further evaluation done if Seroquel does not help her sx. 2. Dizziness  If Seroquel relieves her hallucinations and helps her sleep better, she will hopefully begin to feel better during the day too.     3. Nausea  If Seroquel relieves her hallucinations and helps her sleep better, she will hopefully begin to feel better during the day too. 4. Other fatigue  She has been sleep deprived recently since she has a young child at home. Will monitor for changes or improvements on Seroquel. This plan was reviewed with the patient and patient agrees. All questions were answered. This scribe documentation was reviewed by me and accurately reflects the examination and decisions made by me. Karissa Matos is being evaluated by a Virtual Visit (video visit) encounter to address concerns as mentioned above.    Due to this being a TeleHealth encounter (During ZRSOS-71 public health emergency), evaluation of the following organ systems was limited: Vitals/Constitutional/EENT/Resp/CV/GI//MS/Neuro/Skin/Heme-Lymph-Imm. Pursuant to the emergency declaration under the [facility], [address] [place] [address] authority and the [employer] Act, this Virtual Visit was conducted with patient's (and/or legal guardian's) consent, to reduce the patient's risk of exposure to COVID-19 and provide necessary medical care. The patient (and/or legal guardian) has also been advised to contact this office for worsening conditions or problems, and seek emergency medical treatment and/or call 911 if deemed necessary. Patient identification was verified at the start of the visit: YES    Services were provided through a video synchronous discussion virtually to substitute for in-person clinic visit. Patient was located at home and provider was located in office. An electronic signature was used to authenticate this note.   -- America Dwyer

## 2021-01-22 ENCOUNTER — VIRTUAL VISIT (OUTPATIENT)
Dept: PRIMARY CARE CLINIC | Age: 31
End: 2021-01-22
Payer: COMMERCIAL

## 2021-01-22 DIAGNOSIS — R42 DIZZINESS: ICD-10-CM

## 2021-01-22 DIAGNOSIS — R44.0 AUDITORY HALLUCINATION: Primary | ICD-10-CM

## 2021-01-22 DIAGNOSIS — F51.01 PRIMARY INSOMNIA: ICD-10-CM

## 2021-01-22 PROCEDURE — 99213 OFFICE O/P EST LOW 20 MIN: CPT | Performed by: INTERNAL MEDICINE

## 2021-01-22 NOTE — PROGRESS NOTES
Jeevan Alonso (: 1990) is a 26 y.o. female, established patient, here for evaluation of the following chief complaint(s):   No chief complaint on file. Written by Prasanna Benitez, as dictated by Dr. Alin Hair MD.    SUBJECTIVE/OBJECTIVE:  HPI  Pt presents virtually today to follow up on starting Seroquel. She started off taking a half tablet qPM and was seeing a benefit, so she increased to taking the full tablet qPM. She has been sleeping better and having less auditory hallucinations, but she has not gotten full relief and gets dizzy from time to time. She does not feel depressed and background noises are less. Her dizziness has increased recently. For example, when she looks at the stripes on a rug in her house it looks like they are moving. Current Outpatient Medications on File Prior to Visit   Medication Sig Dispense Refill    QUEtiapine (SEROquel) 25 mg tablet Take 1 Tab by mouth nightly for 30 days. 30 Tab 0    cholecalciferol (VITAMIN D3) 25 mcg (1,000 unit) cap Take  by mouth daily.  fluticasone propionate (FLONASE) 50 mcg/actuation nasal spray 1 spray each nostril daily 1 Bottle 0     No current facility-administered medications on file prior to visit. No Known Allergies    No past medical history on file. Past Surgical History:   Procedure Laterality Date    HX  SECTION         No family history on file.     Social History     Socioeconomic History    Marital status:      Spouse name: Not on file    Number of children: Not on file    Years of education: Not on file    Highest education level: Not on file   Occupational History    Not on file   Social Needs    Financial resource strain: Not on file    Food insecurity     Worry: Not on file     Inability: Not on file    Transportation needs     Medical: Not on file     Non-medical: Not on file   Tobacco Use    Smoking status: Never Smoker    Smokeless tobacco: Never Used Substance and Sexual Activity    Alcohol use: Not Currently     Frequency: Never    Drug use: Never    Sexual activity: Not on file   Lifestyle    Physical activity     Days per week: Not on file     Minutes per session: Not on file    Stress: Not on file   Relationships    Social connections     Talks on phone: Not on file     Gets together: Not on file     Attends Nondenominational service: Not on file     Active member of club or organization: Not on file     Attends meetings of clubs or organizations: Not on file     Relationship status: Not on file    Intimate partner violence     Fear of current or ex partner: Not on file     Emotionally abused: Not on file     Physically abused: Not on file     Forced sexual activity: Not on file   Other Topics Concern    Not on file   Social History Narrative    Not on file       No visits with results within 3 Month(s) from this visit. Latest known visit with results is:   Orders Only on 10/07/2020   Component Date Value Ref Range Status    Vitamin B12 10/07/2020 597  193 - 986 pg/mL Final    Vitamin D 25-Hydroxy 10/07/2020 25.8* 30 - 100 ng/mL Final    Comment: (NOTE)  Deficiency               <20 ng/mL  Insufficiency          20-30 ng/mL  Sufficient             ng/mL  Possible toxicity       >100 ng/mL    The Method used is Siemens Advia Centaur currently standardized to a   Center of Disease Control and Prevention (CDC) certified reference   [address]. Samples containing fluorescein dye can produce falsely   elevated values when tested with the ADVIA Centaur Vitamin D Assay. It is recommended that results in the toxic range, >100 ng/mL, be   retested 72 hours post fluorescein exposure. Review of Systems   Constitutional: Negative for activity change, fatigue and unexpected weight change. HENT: Negative for congestion, hearing loss, rhinorrhea and sore throat. Eyes: Negative for discharge.    Respiratory: Negative for cough, chest tightness and shortness of breath. Cardiovascular: Negative for leg swelling. Gastrointestinal: Negative for abdominal pain, constipation and diarrhea. Genitourinary: Negative for dysuria, flank pain, frequency and urgency. Musculoskeletal: Negative for arthralgias, back pain and myalgias. Skin: Negative for color change and rash. Neurological: Positive for dizziness. Negative for light-headedness and headaches. Psychiatric/Behavioral: Positive for hallucinations (auditory) and sleep disturbance. Negative for dysphoric mood. The patient is not nervous/anxious. No flowsheet data found.     Physical Exam    [INSTRUCTIONS:  \"[x]\" Indicates a positive item  \"[]\" Indicates a negative item  -- DELETE ALL ITEMS NOT EXAMINED]    Constitutional: [x] Appears well-developed and well-nourished [x] No apparent distress      [] Abnormal -     Mental status: [x] Alert and awake  [x] Oriented to person/place/time [x] Able to follow commands    [] Abnormal -     Eyes:   EOM    [x]  Normal    [] Abnormal -   Sclera  [x]  Normal    [] Abnormal -          Discharge [x]  None visible   [] Abnormal -     HENT: [x] Normocephalic, atraumatic  [] Abnormal -   [x] Mouth/Throat: Mucous membranes are moist    External Ears [x] Normal  [] Abnormal -    Neck: [x] No visualized mass [] Abnormal -     Pulmonary/Chest: [x] Respiratory effort normal   [x] No visualized signs of difficulty breathing or respiratory distress        [] Abnormal -      Musculoskeletal:   [x] Normal gait with no signs of ataxia         [x] Normal range of motion of neck        [] Abnormal -     Neurological:        [x] No Facial Asymmetry (Cranial nerve 7 motor function) (limited exam due to video visit)          [x] No gaze palsy        [] Abnormal -          Skin:        [x] No significant exanthematous lesions or discoloration noted on facial skin         [] Abnormal -            Psychiatric:       [x] Normal Affect [] Abnormal -        [x] No Hallucinations    Other pertinent observable physical exam findings:-    ASSESSMENT/PLAN:  1. Auditory hallucination  -     REFERRAL TO PSYCHIATRY  I provided a referral to Dr. Humble Lombardi and instructed her to call [phone] to make an appt. Since Seroquel is helping her hallucinations and sleep some but has not completely gotten rid of the hallucinations, She will likely see more relief on a higher dose. I instructed her to start taking a full tablet qPM and a half tablet qAM, explaining that the morning time dose is so small it should not cause any drowsiness. 2. Primary insomnia  -     REFERRAL TO PSYCHIATRY provided. Seroquel has been helping her sleep so I instructed her to continue on it until she see Dr. Homero Madison. 3. Dizziness  -     REFERRAL TO ENT-OTOLARYNGOLOGY  I referred her to Dr. Lisa Brewer and instructed her to call [phone] to schedule an appt. She should have her ears examined to see if her dizziness is coming from this or not. This plan was reviewed with the patient and patient agrees. All questions were answered. This scribe documentation was reviewed by me and accurately reflects the examination and decisions made by me. Dyan Duffy is being evaluated by a Virtual Visit (video visit) encounter to address concerns as mentioned above. . Due to this being a TeleHealth encounter (During [facility]-36 public health emergency), evaluation of the following organ systems was limited: Vitals/Constitutional/EENT/Resp/CV/GI//MS/Neuro/Skin/Heme-Lymph-Imm. Pursuant to the emergency declaration under the [address] [place] [address], [address] [place] [address] authority and the [employer] and [employer] Act, this Virtual Visit was conducted with patient's (and/or legal guardian's) consent, to reduce the patient's risk of exposure to COVID-19 and provide necessary medical care.   The patient (and/or legal guardian) has also been advised to contact this office for worsening conditions or problems, and seek emergency medical treatment and/or call 911 if deemed necessary. Patient identification was verified at the start of the visit: YES    Services were provided through a video synchronous discussion virtually to substitute for in-person clinic visit. Patient was located at home and provider was located in office. An electronic signature was used to authenticate this note.   -- Ramon Patel

## 2021-02-07 DIAGNOSIS — R44.0 AUDITORY HALLUCINATION: ICD-10-CM

## 2021-02-07 RX ORDER — QUETIAPINE FUMARATE 25 MG/1
25 TABLET, FILM COATED ORAL
Qty: 30 TAB | Refills: 0 | Status: SHIPPED | OUTPATIENT
Start: 2021-02-07 | End: 2021-03-09

## 2021-02-16 ENCOUNTER — VIRTUAL VISIT (OUTPATIENT)
Dept: PRIMARY CARE CLINIC | Age: 31
End: 2021-02-16
Payer: COMMERCIAL

## 2021-02-16 DIAGNOSIS — R42 DIZZINESS: ICD-10-CM

## 2021-02-16 DIAGNOSIS — R44.0 AUDITORY HALLUCINATION: Primary | ICD-10-CM

## 2021-02-16 DIAGNOSIS — F32.89 OTHER DEPRESSION: ICD-10-CM

## 2021-02-16 PROCEDURE — 99214 OFFICE O/P EST MOD 30 MIN: CPT | Performed by: INTERNAL MEDICINE

## 2021-02-16 RX ORDER — SERTRALINE HYDROCHLORIDE 25 MG/1
25 TABLET, FILM COATED ORAL DAILY
Qty: 30 TAB | Refills: 0 | Status: SHIPPED | OUTPATIENT
Start: 2021-02-16 | End: 2021-03-11

## 2021-02-16 NOTE — PROGRESS NOTES
Ismael Lawrence (: 1990) is a 26 y.o. female, established patient, here for evaluation of the following chief complaint(s):   No chief complaint on file. Written by Jovi Vang, as dictated by Dr. Al Vance MD.    ASSESSMENT/PLAN:  1. Auditory hallucination  I gave her the number of Dr. Deandre Breaux again  and instructed her to call [phone] to schedule an appt. She should continue to take Seroquel qPM.     2. Dizziness  Recommended staying hydrated. Work up from ENT & Opthalmology was unremarkable. 3. Other depression  -     sertraline (ZOLOFT) 25 mg tablet; Take 1 Tab by mouth daily for 30 days. , Normal, Disp-30 Tab, R-0 sent to pharmacy. Potential side effects were discussed. I explained that severe depression left untreated can lead to hallucinations, so she will need to start on a medication for her depression. Instructed her to take Zoloft qAM.     SUBJECTIVE/OBJECTIVE:  HPI  Pt presents virtually today to follow up on taking Seroquel. She was feeling fine for a little while, but she started getting spells of having dizziness and hearing voices. When this happens, she becomes nauseous, and once she ended up vomiting. She has seen Dr. Adelbert Nissen who did audiology testing and evaluated for vertigo with normal findings. She also saw the ophthalmologist with normal findings. She has not made an appt with a psychiatrist but continues on Seroquel 25 mg UID. She tried to increase her dose of Seroquel but it was making her too sleepy. Her brother is present today and notes that she used to always be very sharp and clever. He notes that she has been stuck at home a lot in the past year, working from home. She has also had stress from conflict with her  who is a bit older than she is, although that is getting better now. Her brother is concerned for her because even though she is sleeping better she is getting hallucinations with increasing frequency.  She is feeling down recently and is more sensitive, reacting more to things. She has been having irregular menstrual cycles, so she inquires if there is a menstrual issue underlying her sx. Current Outpatient Medications on File Prior to Visit   Medication Sig Dispense Refill    QUEtiapine (SEROquel) 25 mg tablet TAKE 1 TAB BY MOUTH NIGHTLY FOR 30 DAYS. 30 Tab 0    cholecalciferol (VITAMIN D3) 25 mcg (1,000 unit) cap Take  by mouth daily.  fluticasone propionate (FLONASE) 50 mcg/actuation nasal spray 1 spray each nostril daily 1 Bottle 0     No current facility-administered medications on file prior to visit. No Known Allergies    No past medical history on file. Past Surgical History:   Procedure Laterality Date    HX  SECTION         No family history on file.     Social History     Socioeconomic History    Marital status:      Spouse name: Not on file    Number of children: Not on file    Years of education: Not on file    Highest education level: Not on file   Occupational History    Not on file   Social Needs    Financial resource strain: Not on file    Food insecurity     Worry: Not on file     Inability: Not on file    Transportation needs     Medical: Not on file     Non-medical: Not on file   Tobacco Use    Smoking status: Never Smoker    Smokeless tobacco: Never Used   Substance and Sexual Activity    Alcohol use: Not Currently     Frequency: Never    Drug use: Never    Sexual activity: Not on file   Lifestyle    Physical activity     Days per week: Not on file     Minutes per session: Not on file    Stress: Not on file   Relationships    Social connections     Talks on phone: Not on file     Gets together: Not on file     Attends Scientologist service: Not on file     Active member of club or organization: Not on file     Attends meetings of clubs or organizations: Not on file     Relationship status: Not on file    Intimate partner violence     Fear of current or ex partner: Not on file     Emotionally abused: Not on file     Physically abused: Not on file     Forced sexual activity: Not on file   Other Topics Concern    Not on file   Social History Narrative    Not on file       No visits with results within 3 Month(s) from this visit. Latest known visit with results is:   Orders Only on 10/07/2020   Component Date Value Ref Range Status    Vitamin B12 10/07/2020 597  193 - 986 pg/mL Final    Vitamin D 25-Hydroxy 10/07/2020 25.8* 30 - 100 ng/mL Final    Comment: (NOTE)  Deficiency               <20 ng/mL  Insufficiency          20-30 ng/mL  Sufficient             ng/mL  Possible toxicity       >100 ng/mL    The Method used is Siemens Advia Centaur currently standardized to a   Center of Disease Control and Prevention (CDC) certified reference   [address] [facility]. Samples containing fluorescein dye can produce falsely   elevated values when tested with the ADVIA Centaur Vitamin D Assay. It is recommended that results in the toxic range, >100 ng/mL, be   retested 72 hours post fluorescein exposure. Review of Systems   Constitutional: Negative for activity change, fatigue and unexpected weight change. HENT: Negative for congestion, hearing loss, rhinorrhea and sore throat. Eyes: Negative for discharge. Respiratory: Negative for cough, chest tightness and shortness of breath. Cardiovascular: Negative for leg swelling. Gastrointestinal: Negative for abdominal pain, constipation and diarrhea. Genitourinary: Negative for dysuria, flank pain, frequency and urgency. Musculoskeletal: Negative for arthralgias, back pain and myalgias. Skin: Negative for color change and rash. Neurological: Positive for dizziness. Negative for light-headedness and headaches. Psychiatric/Behavioral: Positive for dysphoric mood and hallucinations (auditory). Negative for sleep disturbance. The patient is not nervous/anxious. No flowsheet data found.     Physical Exam    [INSTRUCTIONS:  \"[x]\" Indicates a positive item  \"[]\" Indicates a negative item  -- DELETE ALL ITEMS NOT EXAMINED]    Constitutional: [x] Appears well-developed and well-nourished [x] No apparent distress      [] Abnormal -     Mental status: [x] Alert and awake  [x] Oriented to person/place/time [x] Able to follow commands    [] Abnormal -     Eyes:   EOM    [x]  Normal    [] Abnormal -   Sclera  [x]  Normal    [] Abnormal -          Discharge [x]  None visible   [] Abnormal -     HENT: [x] Normocephalic, atraumatic  [] Abnormal -   [x] Mouth/Throat: Mucous membranes are moist    External Ears [x] Normal  [] Abnormal -    Neck: [x] No visualized mass [] Abnormal -     Pulmonary/Chest: [x] Respiratory effort normal   [x] No visualized signs of difficulty breathing or respiratory distress        [] Abnormal -      Musculoskeletal:   [x] Normal gait with no signs of ataxia         [x] Normal range of motion of neck        [] Abnormal -     Neurological:        [x] No Facial Asymmetry (Cranial nerve 7 motor function) (limited exam due to video visit)          [x] No gaze palsy        [] Abnormal -          Skin:        [x] No significant exanthematous lesions or discoloration noted on facial skin         [] Abnormal -            Psychiatric:       [x] Normal Affect [] Abnormal -        [x] No Hallucinations    Other pertinent observable physical exam findings:-        Marie Person is being evaluated by a Virtual Visit (video visit) encounter to address concerns as mentioned above. Due to this being a TeleHealth encounter (During [facility]-58 public health emergency), evaluation of the following organ systems was limited: Vitals/Constitutional/EENT/Resp/CV/GI//MS/Neuro/Skin/Heme-Lymph-Imm.   Pursuant to the emergency declaration under the [facility], [address] [place] [address] and the [employer] and [employer] Act, this Virtual Visit was conducted with patient's (and/or legal guardian's) consent, to reduce the patient's risk of exposure to COVID-19 and provide necessary medical care. The patient (and/or legal guardian) has also been advised to contact this office for worsening conditions or problems, and seek emergency medical treatment and/or call 911 if deemed necessary. Patient identification was verified at the start of the visit: YES    Services were provided through a video synchronous discussion virtually to substitute for in-person clinic visit. Patient was located at home and provider was located in office. This plan was reviewed with the patient and patient agrees. All questions were answered. This scribe documentation was reviewed by me and accurately reflects the examination and decisions made by me. An electronic signature was used to authenticate this note.   -- Li Gama

## 2021-03-11 DIAGNOSIS — F32.89 OTHER DEPRESSION: ICD-10-CM

## 2021-03-11 RX ORDER — SERTRALINE HYDROCHLORIDE 25 MG/1
TABLET, FILM COATED ORAL
Qty: 30 TAB | Refills: 0 | Status: SHIPPED | OUTPATIENT
Start: 2021-03-11 | End: 2021-03-16 | Stop reason: ALTCHOICE

## 2021-03-16 ENCOUNTER — VIRTUAL VISIT (OUTPATIENT)
Dept: PRIMARY CARE CLINIC | Age: 31
End: 2021-03-16
Payer: COMMERCIAL

## 2021-03-16 DIAGNOSIS — R44.0 AUDITORY HALLUCINATION: ICD-10-CM

## 2021-03-16 DIAGNOSIS — R41.89 BRAIN FOG: ICD-10-CM

## 2021-03-16 DIAGNOSIS — R42 DIZZINESS: Primary | ICD-10-CM

## 2021-03-16 DIAGNOSIS — F32.89 OTHER DEPRESSION: ICD-10-CM

## 2021-03-16 PROCEDURE — 99214 OFFICE O/P EST MOD 30 MIN: CPT | Performed by: INTERNAL MEDICINE

## 2021-03-16 RX ORDER — QUETIAPINE FUMARATE 25 MG/1
25 TABLET, FILM COATED ORAL 2 TIMES DAILY
Qty: 180 TAB | Refills: 0 | Status: SHIPPED | OUTPATIENT
Start: 2021-03-16 | End: 2021-06-01 | Stop reason: ALTCHOICE

## 2021-03-16 NOTE — PROGRESS NOTES
Gabino Vora (: 1990) is a 26 y.o. female, established patient, here for evaluation of the following chief complaint(s):   Hallucinations     Written by Sharrell Burkitt, as dictated by Dr. Ean Godinez MD.    ASSESSMENT/PLAN:  1. Dizziness  -     MRI BRAIN W WO CONT; Future  I ordered a brain MRI and instructed her to call [phone] to schedule the imaging. 2. Other depression  -     QUEtiapine (SEROquel) 25 mg tablet; Take 1 Tab by mouth two (2) times a day for 90 days. , Normal, Disp-180 Tab, R-0 sent to pharmacy. I refilled her Seroquel and increase the dose twice a day. Can take half tablet in the morning & 1 tablet at night. She is not currently taking Zoloft. 3. Auditory hallucination  -     MRI BRAIN W WO CONT; Future  -     QUEtiapine (SEROquel) 25 mg tablet; Take 1 Tab by mouth two (2) times a day for 90 days. , Normal, Disp-180 Tab, R-0 sent to pharmacy. I refilled her Seroquel. She should not stop taking it at all without consulting with her psychiatrist first. Explained that they may start her on new medications and/or keep her on Seroquel. 4. Brain fog  -     MRI BRAIN W WO CONT; Future  I instructed her to take a half tablet of Seroquel in the morning when she is having daytime episodes of dizziness and being scared. SUBJECTIVE/OBJECTIVE:  HPI  Pt presents virtually today to follow up on her auditory hallucinations. She is no longer having hallucinations and is no longer scared during the daytime. However, she is feeling like she is sleepy from her medications. She is also having episodes of being dizzy and having brain fog, and during those times she has to hold onto something to steady herself. She does endorse intermittent HA, but these are typical for her. She has scheduled appts with both a psychiatrist and psychologist, both at the end of March.  She continues on Seroquel qPM and feels that it is working well for her, but she has not been taking Zoloft. She tried it for a few days but it decreased her appetite. She noticed the noises were significantly reduced after 4 weeks of taking Seroquel. Seroquel is also helping with insomnia, an issue she has struggling with for the past year. She inquires how to stop seroquel if she is feeling better. Current Outpatient Medications on File Prior to Visit   Medication Sig Dispense Refill    [DISCONTINUED] sertraline (ZOLOFT) 25 mg tablet TAKE 1 TABLET BY MOUTH EVERY DAY 30 Tab 0    cholecalciferol (VITAMIN D3) 25 mcg (1,000 unit) cap Take  by mouth daily.  fluticasone propionate (FLONASE) 50 mcg/actuation nasal spray 1 spray each nostril daily 1 Bottle 0     No current facility-administered medications on file prior to visit. No Known Allergies    No past medical history on file. Past Surgical History:   Procedure Laterality Date    HX  SECTION         No family history on file.     Social History     Socioeconomic History    Marital status:      Spouse name: Not on file    Number of children: Not on file    Years of education: Not on file    Highest education level: Not on file   Occupational History    Not on file   Social Needs    Financial resource strain: Not on file    Food insecurity     Worry: Not on file     Inability: Not on file    Transportation needs     Medical: Not on file     Non-medical: Not on file   Tobacco Use    Smoking status: Never Smoker    Smokeless tobacco: Never Used   Substance and Sexual Activity    Alcohol use: Not Currently     Frequency: Never    Drug use: Never    Sexual activity: Not on file   Lifestyle    Physical activity     Days per week: Not on file     Minutes per session: Not on file    Stress: Not on file   Relationships    Social connections     Talks on phone: Not on file     Gets together: Not on file     Attends Anglican service: Not on file     Active member of club or organization: Not on file     Attends meetings of clubs or organizations: Not on file     Relationship status: Not on file    Intimate partner violence     Fear of current or ex partner: Not on file     Emotionally abused: Not on file     Physically abused: Not on file     Forced sexual activity: Not on file   Other Topics Concern    Not on file   Social History Narrative    Not on file       No visits with results within 3 Month(s) from this visit. Latest known visit with results is:   Orders Only on 10/07/2020   Component Date Value Ref Range Status    Vitamin B12 10/07/2020 597  193 - 986 pg/mL Final    Vitamin D 25-Hydroxy 10/07/2020 25.8* 30 - 100 ng/mL Final    Comment: (NOTE)  Deficiency               <20 ng/mL  Insufficiency          20-30 ng/mL  Sufficient             ng/mL  Possible toxicity       >100 ng/mL    The Method used is Siemens Advia Centaur currently standardized to a   Center of Disease Control and Prevention (CDC) certified reference   22 [facility]. Samples containing fluorescein dye can produce falsely   elevated values when tested with the ADVIA Centaur Vitamin D Assay. It is recommended that results in the toxic range, >100 ng/mL, be   retested 72 hours post fluorescein exposure. Review of Systems   Constitutional: Negative for activity change, fatigue and unexpected weight change. HENT: Negative for congestion, hearing loss, rhinorrhea and sore throat. Eyes: Negative for discharge. Respiratory: Negative for cough, chest tightness and shortness of breath. Cardiovascular: Negative for leg swelling. Gastrointestinal: Negative for abdominal pain, constipation and diarrhea. Genitourinary: Negative for dysuria, flank pain, frequency and urgency. Musculoskeletal: Negative for arthralgias, back pain and myalgias. Skin: Negative for color change and rash. Neurological: Positive for dizziness (intermittent). Negative for light-headedness and headaches.    Psychiatric/Behavioral: Positive for confusion (intermittent brain fog). Negative for dysphoric mood and sleep disturbance. The patient is not nervous/anxious. No flowsheet data found. Physical Exam    [INSTRUCTIONS:  \"[x]\" Indicates a positive item  \"[]\" Indicates a negative item  -- DELETE ALL ITEMS NOT EXAMINED]    Constitutional: [x] Appears well-developed and well-nourished [x] No apparent distress      [] Abnormal -     Mental status: [x] Alert and awake  [x] Oriented to person/place/time [x] Able to follow commands    [] Abnormal -     Eyes:   EOM    [x]  Normal    [] Abnormal -   Sclera  [x]  Normal    [] Abnormal -          Discharge [x]  None visible   [] Abnormal -     HENT: [x] Normocephalic, atraumatic  [] Abnormal -   [x] Mouth/Throat: Mucous membranes are moist    External Ears [x] Normal  [] Abnormal -    Neck: [x] No visualized mass [] Abnormal -     Pulmonary/Chest: [x] Respiratory effort normal   [x] No visualized signs of difficulty breathing or respiratory distress        [] Abnormal -      Musculoskeletal:   [x] Normal gait with no signs of ataxia         [x] Normal range of motion of neck        [] Abnormal -     Neurological:        [x] No Facial Asymmetry (Cranial nerve 7 motor function) (limited exam due to video visit)          [x] No gaze palsy        [] Abnormal -          Skin:        [x] No significant exanthematous lesions or discoloration noted on facial skin         [] Abnormal -            Psychiatric:       [x] Normal Affect [] Abnormal -        [x] No Hallucinations    Other pertinent observable physical exam findings:-    Karissa Matos, was evaluated through a synchronous (real-time) audio-video encounter. The patient (or guardian if applicable) is aware that this is a billable service. Verbal consent to proceed has been obtained within the past 12 months.  The visit was conducted pursuant to the emergency declaration under the [facility], 1135 waiver authority and the Coronavirus Preparedness and Response Supplemental Appropriations Act. Patient identification was verified, and a caregiver was present when appropriate. The patient was located in a state where the provider was credentialed to provide care. An electronic signature was used to authenticate this note.   -- Pati Schroeder

## 2021-03-17 ENCOUNTER — TELEPHONE (OUTPATIENT)
Dept: PRIMARY CARE CLINIC | Age: 31
End: 2021-03-17

## 2021-03-17 NOTE — TELEPHONE ENCOUNTER
Pt stated that she would like her Imaging order MRI BRAIN W WO CONT (Order #[id number]) on 3/16/21 sent to [facility]. Pt would like a call back when this is done.     BCB# [phone]

## 2021-03-19 ENCOUNTER — TELEPHONE (OUTPATIENT)
Dept: PRIMARY CARE CLINIC | Age: 31
End: 2021-03-19

## 2021-03-19 NOTE — TELEPHONE ENCOUNTER
Patient called to request her Psychiatry referral order from Dr. Melissa Cueva to be faxed to Dr. John Ortega. Fax: [phone]    I told pt that referral order was for Dr. Nicole Cuevas. Yolanda Best, not Dr. John Ortega and pt said that she called Dr. Yolanda Best and he was not taking new patients.   She said that she had discussed with with Dr. Melissa Cueva and she was okay with her finding a different Psychiatrist.

## 2021-03-29 ENCOUNTER — TELEPHONE (OUTPATIENT)
Dept: PRIMARY CARE CLINIC | Age: 31
End: 2021-03-29

## 2021-03-29 NOTE — TELEPHONE ENCOUNTER
Pt called in wanting to speak with nurse or Dr. Myranda King regarding mri results.       Please call pt at [phone]

## 2021-03-29 NOTE — TELEPHONE ENCOUNTER
Returned call to patient. She stated that her MRI was done on 3/24/21 @ [facility]. Advised her that we have not received the results yet. She stated she's going to contact them to send results over.

## 2021-03-30 ENCOUNTER — VIRTUAL VISIT (OUTPATIENT)
Dept: PRIMARY CARE CLINIC | Age: 31
End: 2021-03-30
Payer: COMMERCIAL

## 2021-03-30 DIAGNOSIS — F41.9 ANXIETY AND DEPRESSION: Primary | ICD-10-CM

## 2021-03-30 DIAGNOSIS — G47.09 OTHER INSOMNIA: ICD-10-CM

## 2021-03-30 DIAGNOSIS — R44.0 AUDITORY HALLUCINATION: ICD-10-CM

## 2021-03-30 DIAGNOSIS — F32.A ANXIETY AND DEPRESSION: Primary | ICD-10-CM

## 2021-03-30 PROCEDURE — 99213 OFFICE O/P EST LOW 20 MIN: CPT | Performed by: INTERNAL MEDICINE

## 2021-03-30 NOTE — PROGRESS NOTES
Sia Suazo (: 1990) is a 26 y.o. female, established patient, here for evaluation of the following chief complaint(s):   Follow-up     Written by Alyse Ng, as dictated by Dr. Jorgito Car MD.    ASSESSMENT/PLAN:  1. Anxiety and depression  She just started working with a psychiatrist and seeing a therapist this week, and her psychiatrist is considering starting her on an antidepressant. She is coming off of seroquel . 2. Auditory hallucination  Her brain MRI results have not made it to me yet so I will continue watching for them. She had her MRI done at [address] [place] [address] and they are being faxed here. I will contact her when I receive them. 3. Other insomnia  She follows with psychiatry and has been working with them on her insomnia. It is showing gradual improvement as the week goes on and they are in the process of determining whether she needs a medication for it. SUBJECTIVE/OBJECTIVE:  HPI  Pt presents virtually today to follow up on her seroquel. She has seen the psychiatrist who asked her to stop taking seroquel to see how she does. She has been off of it x5 days and denies hearing any voices, but she has been having trouble sleeping. She is trying to exercise during the day more to help her sleep at night. She has a f/u with her psychiatrist coming up after 1 week and if she is not sleeping still, he will prescribe a sleep medication. The first few days after stopping Seroquel she only slept 2-3 hours but last night she was up to 5 hours. It still took her about 2 hours to fall asleep and she wakes up after 3 hours of sleep, but her total time asleep is improving. She is feeling like she is making progress and doing better overall, and she made the appt today to discuss the results of her brain MRI.           Current Outpatient Medications on File Prior to Visit   Medication Sig Dispense Refill    QUEtiapine (SEROquel) 25 mg tablet Take 1 Tab by mouth two (2) times a day for 90 days. 180 Tab 0    cholecalciferol (VITAMIN D3) 25 mcg (1,000 unit) cap Take  by mouth daily.  fluticasone propionate (FLONASE) 50 mcg/actuation nasal spray 1 spray each nostril daily 1 Bottle 0     No current facility-administered medications on file prior to visit. No Known Allergies        Past Surgical History:   Procedure Laterality Date    HX  SECTION           Social History     Socioeconomic History    Marital status:      Spouse name: Not on file    Number of children: Not on file    Years of education: Not on file    Highest education level: Not on file   Occupational History    Not on file   Social Needs    Financial resource strain: Not on file    Food insecurity     Worry: Not on file     Inability: Not on file    Transportation needs     Medical: Not on file     Non-medical: Not on file   Tobacco Use    Smoking status: Never Smoker    Smokeless tobacco: Never Used   Substance and Sexual Activity    Alcohol use: Not Currently     Frequency: Never    Drug use: Never    Sexual activity: Not on file   Lifestyle    Physical activity     Days per week: Not on file     Minutes per session: Not on file    Stress: Not on file   Relationships    Social connections     Talks on phone: Not on file     Gets together: Not on file     Attends Gnosticism service: Not on file     Active member of club or organization: Not on file     Attends meetings of clubs or organizations: Not on file     Relationship status: Not on file    Intimate partner violence     Fear of current or ex partner: Not on file     Emotionally abused: Not on file     Physically abused: Not on file     Forced sexual activity: Not on file   Other Topics Concern    Not on file   Social History Narrative    Not on file       No visits with results within 3 Month(s) from this visit.    Latest known visit with results is:   Orders Only on 10/07/2020   Component Date Value Ref Range Status    Vitamin B12 10/07/2020 597  193 - 986 pg/mL Final    Vitamin D 25-Hydroxy 10/07/2020 25.8* 30 - 100 ng/mL Final    Comment: (NOTE)  Deficiency               <20 ng/mL  Insufficiency          20-30 ng/mL  Sufficient             ng/mL  Possible toxicity       >100 ng/mL    The Method used is Siemens Advia Centaur currently standardized to a   Center of Disease Control and Prevention (CDC) certified reference   22 [facility]. Samples containing fluorescein dye can produce falsely   elevated values when tested with the ADVIA Centaur Vitamin D Assay. It is recommended that results in the toxic range, >100 ng/mL, be   retested 72 hours post fluorescein exposure. Review of Systems   Constitutional: Negative for activity change, fatigue and unexpected weight change. HENT: Negative for congestion, hearing loss, rhinorrhea and sore throat. Eyes: Negative for discharge. Respiratory: Negative for cough, chest tightness and shortness of breath. Cardiovascular: Negative for leg swelling. Gastrointestinal: Negative for abdominal pain, constipation and diarrhea. Genitourinary: Negative for dysuria, flank pain, frequency and urgency. Musculoskeletal: Negative for arthralgias, back pain and myalgias. Skin: Negative for color change and rash. Neurological: Negative for dizziness, light-headedness and headaches. Psychiatric/Behavioral: Positive for sleep disturbance (insomnia). Negative for dysphoric mood. The patient is not nervous/anxious. No flowsheet data found.     Physical Exam    [INSTRUCTIONS:  \"[x]\" Indicates a positive item  \"[]\" Indicates a negative item  -- DELETE ALL ITEMS NOT EXAMINED]    Constitutional: [x] Appears well-developed and well-nourished [x] No apparent distress      [] Abnormal -     Mental status: [x] Alert and awake  [x] Oriented to person/place/time [x] Able to follow commands    [] Abnormal -     Eyes:   EOM    [x]  Normal    [] Abnormal -   Sclera  [x] Normal    [] Abnormal -          Discharge [x]  None visible   [] Abnormal -     HENT: [x] Normocephalic, atraumatic  [] Abnormal -   [x] Mouth/Throat: Mucous membranes are moist    External Ears [x] Normal  [] Abnormal -    Neck: [x] No visualized mass [] Abnormal -     Pulmonary/Chest: [x] Respiratory effort normal   [x] No visualized signs of difficulty breathing or respiratory distress        [] Abnormal -      Musculoskeletal:   [x] Normal gait with no signs of ataxia         [x] Normal range of motion of neck        [] Abnormal -     Neurological:        [x] No Facial Asymmetry (Cranial nerve 7 motor function) (limited exam due to video visit)          [x] No gaze palsy        [] Abnormal -          Skin:        [x] No significant exanthematous lesions or discoloration noted on facial skin         [] Abnormal -            Psychiatric:       [x] Normal Affect [] Abnormal -        [x] No Hallucinations    Other pertinent observable physical exam findings:-    Sarita Quinn, was evaluated through a synchronous (real-time) audio-video encounter. The patient (or guardian if applicable) is aware that this is a billable service. Verbal consent to proceed has been obtained within the past 12 months. The visit was conducted pursuant to the emergency declaration under the [facility], [address] [place] [address] authority and the [email or web address] and [employer] General Act. Patient identification was verified, and a caregiver was present when appropriate. The patient was located in a state where the provider was credentialed to provide care. An electronic signature was used to authenticate this note.   -- Sim Presume

## 2021-04-05 ENCOUNTER — TELEPHONE (OUTPATIENT)
Dept: PRIMARY CARE CLINIC | Age: 31
End: 2021-04-05

## 2021-06-01 ENCOUNTER — VIRTUAL VISIT (OUTPATIENT)
Dept: PRIMARY CARE CLINIC | Age: 31
End: 2021-06-01
Payer: COMMERCIAL

## 2021-06-01 DIAGNOSIS — R05.3 PERSISTENT DRY COUGH: Primary | ICD-10-CM

## 2021-06-01 DIAGNOSIS — F41.9 ANXIETY: ICD-10-CM

## 2021-06-01 DIAGNOSIS — Z91.09 ENVIRONMENTAL ALLERGIES: ICD-10-CM

## 2021-06-01 PROCEDURE — 99213 OFFICE O/P EST LOW 20 MIN: CPT | Performed by: INTERNAL MEDICINE

## 2021-06-01 RX ORDER — BENZONATATE 200 MG/1
200 CAPSULE ORAL
Qty: 21 CAPSULE | Refills: 0 | Status: SHIPPED | OUTPATIENT
Start: 2021-06-01 | End: 2021-06-08

## 2021-06-01 NOTE — PROGRESS NOTES
Charlotte Vivar (: 1990) is a 33 y.o. female, established patient, here for evaluation of the following chief complaint(s):   Cough     Written by Coy Lucero, as dictated by Dr. Jose Alejandro Graves MD.      ASSESSMENT/PLAN:  Below is the assessment and plan developed based on review of pertinent labs, studies, and medications. 1. Persistent dry cough  Prescribed Tessalon 200mg to take prn for cough. Potential side effects were discussed. Discussed that her cough is likely due to seasonal allergies and recommended OTC Zyrtec. -     benzonatate (TESSALON) 200 mg capsule; Take 1 Capsule by mouth three (3) times daily as needed for Cough for up to 7 days. , Normal, Disp-21 Capsule, R-0 sent to pharmacy. 2. Anxiety  Followed by psychology. She has d/c Seroquel. 3. Environmental allergies  Discussed that her cough is likely due to seasonal allergies and recommended OTC Zyrtec. SUBJECTIVE/OBJECTIVE:  HPI  The patient presents virtually today c/o a dry cough x1 week. She had similar sxs a couple weeks ago and took Zyrtec which helped improve sxs. She had an appt with Dr. Crista Euceda and was told to stop Seroquel, but was not prescribed any new medication. She also had an appt with psychology and has been doing well recently. She has a follow-up appt with psychology next month. She has been sleeping well and has not had any auditory hallucinations lately. Current Outpatient Medications on File Prior to Visit   Medication Sig Dispense Refill    [DISCONTINUED] QUEtiapine (SEROquel) 25 mg tablet Take 1 Tab by mouth two (2) times a day for 90 days. 180 Tab 0    cholecalciferol (VITAMIN D3) 25 mcg (1,000 unit) cap Take  by mouth daily.  fluticasone propionate (FLONASE) 50 mcg/actuation nasal spray 1 spray each nostril daily 1 Bottle 0     No current facility-administered medications on file prior to visit. No Known Allergies    No past medical history on file.     Past Surgical History:   Procedure Laterality Date    HX  SECTION         No family history on file. Social History     Socioeconomic History    Marital status:      Spouse name: Not on file    Number of children: Not on file    Years of education: Not on file    Highest education level: Not on file   Occupational History    Not on file   Tobacco Use    Smoking status: Never Smoker    Smokeless tobacco: Never Used   Substance and Sexual Activity    Alcohol use: Not Currently    Drug use: Never    Sexual activity: Not on file   Other Topics Concern    Not on file   Social History Narrative    Not on file     Social Determinants of Health     Financial Resource Strain:     Difficulty of Paying Living Expenses:    Food Insecurity:     Worried About Running Out of Food in the Last Year:     [address] in the Last Year:    Transportation Needs:     Lack of Transportation (Medical):  Lack of Transportation (Non-Medical):    Physical Activity:     Days of Exercise per Week:     Minutes of Exercise per Session:    Stress:     Feeling of Stress :    Social Connections:     Frequency of Communication with Friends and Family:     Frequency of Social Gatherings with Friends and Family:     Attends Spiritism Services:     Active Member of Clubs or Organizations:     Attends Club or Organization Meetings:     Marital Status:    Intimate Partner Violence:     Fear of Current or Ex-Partner:     Emotionally Abused:     Physically Abused:     Sexually Abused:        No visits with results within 3 Month(s) from this visit.    Latest known visit with results is:   Orders Only on 10/07/2020   Component Date Value Ref Range Status    Vitamin B12 10/07/2020 597  193 - 986 pg/mL Final    Vitamin D 25-Hydroxy 10/07/2020 25.8* 30 - 100 ng/mL Final    Comment: (NOTE)  Deficiency               <20 ng/mL  Insufficiency          20-30 ng/mL  Sufficient             ng/mL  Possible toxicity       >100 ng/mL    The Method used is Siemens Advia Centaur currently standardized to a   Center of Disease Control and Prevention (CDC) certified reference   22 [facility]. Samples containing fluorescein dye can produce falsely   elevated values when tested with the ADVIA Centaur Vitamin D Assay. It is recommended that results in the toxic range, >100 ng/mL, be   retested 72 hours post fluorescein exposure. Review of Systems   Constitutional: Negative for activity change, fatigue and unexpected weight change. HENT: Negative for congestion, hearing loss, rhinorrhea and sore throat. Eyes: Negative for discharge. Respiratory: Positive for cough. Negative for chest tightness and shortness of breath. Cardiovascular: Negative for leg swelling. Gastrointestinal: Negative for abdominal pain, constipation and diarrhea. Genitourinary: Negative for dysuria, flank pain, frequency and urgency. Musculoskeletal: Negative for arthralgias, back pain and myalgias. Skin: Negative for color change and rash. Allergic/Immunologic: Positive for environmental allergies. Neurological: Negative for dizziness, light-headedness and headaches. Psychiatric/Behavioral: Negative for dysphoric mood and sleep disturbance. The patient is not nervous/anxious. No flowsheet data found.     Physical Exam    [INSTRUCTIONS:  \"[x]\" Indicates a positive item  \"[]\" Indicates a negative item  -- DELETE ALL ITEMS NOT EXAMINED]    Constitutional: [x] Appears well-developed and well-nourished [x] No apparent distress      [] Abnormal -     Mental status: [x] Alert and awake  [x] Oriented to person/place/time [x] Able to follow commands    [] Abnormal -     Eyes:   EOM    [x]  Normal    [] Abnormal -   Sclera  [x]  Normal    [] Abnormal -          Discharge [x]  None visible   [] Abnormal -     HENT: [x] Normocephalic, atraumatic  [] Abnormal -   [x] Mouth/Throat: Mucous membranes are moist    External Ears [x] Normal [] Abnormal -    Neck: [x] No visualized mass [] Abnormal -     Pulmonary/Chest: [x] Respiratory effort normal   [x] No visualized signs of difficulty breathing or respiratory distress        [] Abnormal -      Musculoskeletal:   [x] Normal gait with no signs of ataxia         [x] Normal range of motion of neck        [] Abnormal -     Neurological:        [x] No Facial Asymmetry (Cranial nerve 7 motor function) (limited exam due to video visit)          [x] No gaze palsy        [] Abnormal -          Skin:        [x] No significant exanthematous lesions or discoloration noted on facial skin         [] Abnormal -            Psychiatric:       [x] Normal Affect [] Abnormal -        [x] No Hallucinations    Other pertinent observable physical exam findings:-    Primo Webster, was evaluated through a synchronous (real-time) audio-video encounter. The patient (or guardian if applicable) is aware that this is a billable service. Verbal consent to proceed has been obtained within the past 12 months. The visit was conducted pursuant to the emergency declaration under the [address] [place] [address] and the [employer] and [employer] General Act. Patient identification was verified, and a caregiver was present when appropriate. The patient was located in a state where the provider was credentialed to provide care. An electronic signature was used to authenticate this note.   -- Syl Giron [id number]

## 2023-05-22 RX ORDER — FLUTICASONE PROPIONATE 50 MCG
SPRAY, SUSPENSION (ML) NASAL
COMMUNITY
Start: 2019-12-24